# Patient Record
Sex: FEMALE | Race: WHITE | NOT HISPANIC OR LATINO
[De-identification: names, ages, dates, MRNs, and addresses within clinical notes are randomized per-mention and may not be internally consistent; named-entity substitution may affect disease eponyms.]

---

## 2018-12-03 PROBLEM — Z00.00 ENCOUNTER FOR PREVENTIVE HEALTH EXAMINATION: Status: ACTIVE | Noted: 2018-12-03

## 2018-12-27 ENCOUNTER — APPOINTMENT (OUTPATIENT)
Age: 33
End: 2018-12-27
Payer: MEDICAID

## 2018-12-27 ENCOUNTER — ASOB RESULT (OUTPATIENT)
Age: 33
End: 2018-12-27

## 2018-12-27 PROCEDURE — 36416 COLLJ CAPILLARY BLOOD SPEC: CPT

## 2018-12-27 PROCEDURE — 76813 OB US NUCHAL MEAS 1 GEST: CPT

## 2018-12-27 PROCEDURE — 99242 OFF/OP CONSLTJ NEW/EST SF 20: CPT | Mod: 25

## 2018-12-27 PROCEDURE — 76801 OB US < 14 WKS SINGLE FETUS: CPT

## 2019-01-03 ENCOUNTER — ASOB RESULT (OUTPATIENT)
Age: 34
End: 2019-01-03

## 2019-01-03 ENCOUNTER — APPOINTMENT (OUTPATIENT)
Dept: ANTEPARTUM | Facility: CLINIC | Age: 34
End: 2019-01-03
Payer: MEDICAID

## 2019-01-03 PROCEDURE — 36415 COLL VENOUS BLD VENIPUNCTURE: CPT

## 2019-01-03 PROCEDURE — 99213 OFFICE O/P EST LOW 20 MIN: CPT

## 2019-01-31 ENCOUNTER — APPOINTMENT (OUTPATIENT)
Dept: ANTEPARTUM | Facility: CLINIC | Age: 34
End: 2019-01-31

## 2019-02-21 ENCOUNTER — APPOINTMENT (OUTPATIENT)
Dept: ANTEPARTUM | Facility: CLINIC | Age: 34
End: 2019-02-21
Payer: MEDICAID

## 2019-02-21 ENCOUNTER — ASOB RESULT (OUTPATIENT)
Age: 34
End: 2019-02-21

## 2019-02-21 PROCEDURE — 76817 TRANSVAGINAL US OBSTETRIC: CPT

## 2019-02-21 PROCEDURE — 76811 OB US DETAILED SNGL FETUS: CPT

## 2019-07-18 ENCOUNTER — INPATIENT (INPATIENT)
Facility: HOSPITAL | Age: 34
LOS: 2 days | Discharge: HOME | End: 2019-07-21
Attending: SPECIALIST | Admitting: SPECIALIST

## 2019-07-18 DIAGNOSIS — Z3A.00 WEEKS OF GESTATION OF PREGNANCY NOT SPECIFIED: ICD-10-CM

## 2019-07-18 DIAGNOSIS — O26.899 OTHER SPECIFIED PREGNANCY RELATED CONDITIONS, UNSPECIFIED TRIMESTER: ICD-10-CM

## 2019-07-19 ENCOUNTER — TRANSCRIPTION ENCOUNTER (OUTPATIENT)
Age: 34
End: 2019-07-19

## 2019-07-19 VITALS — HEART RATE: 99 BPM | SYSTOLIC BLOOD PRESSURE: 111 MMHG | DIASTOLIC BLOOD PRESSURE: 70 MMHG

## 2019-07-19 LAB
ANISOCYTOSIS BLD QL: SLIGHT — SIGNIFICANT CHANGE UP
BASOPHILS # BLD AUTO: 0.09 K/UL — SIGNIFICANT CHANGE UP (ref 0–0.2)
BASOPHILS NFR BLD AUTO: 0.5 % — SIGNIFICANT CHANGE UP (ref 0–2)
BASOPHILS NFR SPEC: 0 % — SIGNIFICANT CHANGE UP (ref 0–2)
BLASTS # FLD: 0 % — SIGNIFICANT CHANGE UP (ref 0–0)
BLD GP AB SCN SERPL QL: NEGATIVE — SIGNIFICANT CHANGE UP
EOSINOPHIL # BLD AUTO: 0.1 K/UL — SIGNIFICANT CHANGE UP (ref 0–0.5)
EOSINOPHIL NFR BLD AUTO: 0.5 % — SIGNIFICANT CHANGE UP (ref 0–6)
EOSINOPHIL NFR FLD: 0 % — SIGNIFICANT CHANGE UP (ref 0–6)
GIANT PLATELETS BLD QL SMEAR: PRESENT — SIGNIFICANT CHANGE UP
HCT VFR BLD CALC: 39.9 % — SIGNIFICANT CHANGE UP (ref 34.5–45)
HGB BLD-MCNC: 13.2 G/DL — SIGNIFICANT CHANGE UP (ref 11.5–15.5)
IMM GRANULOCYTES NFR BLD AUTO: 0.9 % — SIGNIFICANT CHANGE UP (ref 0–1.5)
LYMPHOCYTES # BLD AUTO: 14.1 % — SIGNIFICANT CHANGE UP (ref 13–44)
LYMPHOCYTES # BLD AUTO: 2.57 K/UL — SIGNIFICANT CHANGE UP (ref 1–3.3)
LYMPHOCYTES NFR SPEC AUTO: 13.1 % — SIGNIFICANT CHANGE UP (ref 13–44)
MCHC RBC-ENTMCNC: 31.3 PG — SIGNIFICANT CHANGE UP (ref 27–34)
MCHC RBC-ENTMCNC: 33.1 % — SIGNIFICANT CHANGE UP (ref 32–36)
MCV RBC AUTO: 94.5 FL — SIGNIFICANT CHANGE UP (ref 80–100)
METAMYELOCYTES # FLD: 0 % — SIGNIFICANT CHANGE UP (ref 0–1)
MONOCYTES # BLD AUTO: 1.67 K/UL — HIGH (ref 0–0.9)
MONOCYTES NFR BLD AUTO: 9.2 % — SIGNIFICANT CHANGE UP (ref 2–14)
MONOCYTES NFR BLD: 7.8 % — SIGNIFICANT CHANGE UP (ref 2–9)
MYELOCYTES NFR BLD: 0 % — SIGNIFICANT CHANGE UP (ref 0–0)
NEUTROPHIL AB SER-ACNC: 73 % — SIGNIFICANT CHANGE UP (ref 43–77)
NEUTROPHILS # BLD AUTO: 13.61 K/UL — HIGH (ref 1.8–7.4)
NEUTROPHILS NFR BLD AUTO: 74.8 % — SIGNIFICANT CHANGE UP (ref 43–77)
NEUTS BAND # BLD: 2.6 % — SIGNIFICANT CHANGE UP (ref 0–6)
NRBC # FLD: 0 K/UL — SIGNIFICANT CHANGE UP (ref 0–0)
OTHER - HEMATOLOGY %: 0 — SIGNIFICANT CHANGE UP
PLATELET # BLD AUTO: 278 K/UL — SIGNIFICANT CHANGE UP (ref 150–400)
PLATELET COUNT - ESTIMATE: NORMAL — SIGNIFICANT CHANGE UP
PMV BLD: 10.1 FL — SIGNIFICANT CHANGE UP (ref 7–13)
PROMYELOCYTES # FLD: 0 % — SIGNIFICANT CHANGE UP (ref 0–0)
RBC # BLD: 4.22 M/UL — SIGNIFICANT CHANGE UP (ref 3.8–5.2)
RBC # FLD: 13 % — SIGNIFICANT CHANGE UP (ref 10.3–14.5)
RH IG SCN BLD-IMP: POSITIVE — SIGNIFICANT CHANGE UP
RH IG SCN BLD-IMP: POSITIVE — SIGNIFICANT CHANGE UP
VARIANT LYMPHS # BLD: 3.5 % — SIGNIFICANT CHANGE UP
WBC # BLD: 18.2 K/UL — HIGH (ref 3.8–10.5)
WBC # FLD AUTO: 18.2 K/UL — HIGH (ref 3.8–10.5)

## 2019-07-19 RX ORDER — LANOLIN
1 OINTMENT (GRAM) TOPICAL EVERY 6 HOURS
Refills: 0 | Status: DISCONTINUED | OUTPATIENT
Start: 2019-07-19 | End: 2019-07-21

## 2019-07-19 RX ORDER — PRAMOXINE HYDROCHLORIDE 150 MG/15G
1 AEROSOL, FOAM RECTAL EVERY 4 HOURS
Refills: 0 | Status: DISCONTINUED | OUTPATIENT
Start: 2019-07-19 | End: 2019-07-21

## 2019-07-19 RX ORDER — TETANUS TOXOID, REDUCED DIPHTHERIA TOXOID AND ACELLULAR PERTUSSIS VACCINE, ADSORBED 5; 2.5; 8; 8; 2.5 [IU]/.5ML; [IU]/.5ML; UG/.5ML; UG/.5ML; UG/.5ML
0.5 SUSPENSION INTRAMUSCULAR ONCE
Refills: 0 | Status: DISCONTINUED | OUTPATIENT
Start: 2019-07-19 | End: 2019-07-21

## 2019-07-19 RX ORDER — AMPICILLIN TRIHYDRATE 250 MG
1 CAPSULE ORAL EVERY 4 HOURS
Refills: 0 | Status: DISCONTINUED | OUTPATIENT
Start: 2019-07-19 | End: 2019-07-19

## 2019-07-19 RX ORDER — ACETAMINOPHEN 500 MG
975 TABLET ORAL
Refills: 0 | Status: DISCONTINUED | OUTPATIENT
Start: 2019-07-19 | End: 2019-07-21

## 2019-07-19 RX ORDER — DIBUCAINE 1 %
1 OINTMENT (GRAM) RECTAL EVERY 6 HOURS
Refills: 0 | Status: DISCONTINUED | OUTPATIENT
Start: 2019-07-19 | End: 2019-07-21

## 2019-07-19 RX ORDER — SODIUM CHLORIDE 9 MG/ML
3 INJECTION INTRAMUSCULAR; INTRAVENOUS; SUBCUTANEOUS EVERY 8 HOURS
Refills: 0 | Status: DISCONTINUED | OUTPATIENT
Start: 2019-07-19 | End: 2019-07-21

## 2019-07-19 RX ORDER — BENZOCAINE 10 %
1 GEL (GRAM) MUCOUS MEMBRANE EVERY 6 HOURS
Refills: 0 | Status: DISCONTINUED | OUTPATIENT
Start: 2019-07-19 | End: 2019-07-21

## 2019-07-19 RX ORDER — DOCUSATE SODIUM 100 MG
100 CAPSULE ORAL
Refills: 0 | Status: DISCONTINUED | OUTPATIENT
Start: 2019-07-19 | End: 2019-07-21

## 2019-07-19 RX ORDER — OXYTOCIN 10 UNIT/ML
VIAL (ML) INJECTION
Qty: 20 | Refills: 0 | Status: DISCONTINUED | OUTPATIENT
Start: 2019-07-19 | End: 2019-07-19

## 2019-07-19 RX ORDER — GLYCERIN ADULT
1 SUPPOSITORY, RECTAL RECTAL AT BEDTIME
Refills: 0 | Status: DISCONTINUED | OUTPATIENT
Start: 2019-07-19 | End: 2019-07-21

## 2019-07-19 RX ORDER — AMPICILLIN TRIHYDRATE 250 MG
2 CAPSULE ORAL ONCE
Refills: 0 | Status: COMPLETED | OUTPATIENT
Start: 2019-07-19 | End: 2019-07-19

## 2019-07-19 RX ORDER — OXYCODONE HYDROCHLORIDE 5 MG/1
5 TABLET ORAL ONCE
Refills: 0 | Status: DISCONTINUED | OUTPATIENT
Start: 2019-07-19 | End: 2019-07-21

## 2019-07-19 RX ORDER — OXYCODONE HYDROCHLORIDE 5 MG/1
5 TABLET ORAL
Refills: 0 | Status: DISCONTINUED | OUTPATIENT
Start: 2019-07-19 | End: 2019-07-21

## 2019-07-19 RX ORDER — OXYTOCIN 10 UNIT/ML
333.33 VIAL (ML) INJECTION
Qty: 20 | Refills: 0 | Status: DISCONTINUED | OUTPATIENT
Start: 2019-07-19 | End: 2019-07-19

## 2019-07-19 RX ORDER — HYDROCORTISONE 1 %
1 OINTMENT (GRAM) TOPICAL EVERY 6 HOURS
Refills: 0 | Status: DISCONTINUED | OUTPATIENT
Start: 2019-07-19 | End: 2019-07-21

## 2019-07-19 RX ORDER — AER TRAVELER 0.5 G/1
1 SOLUTION RECTAL; TOPICAL EVERY 4 HOURS
Refills: 0 | Status: DISCONTINUED | OUTPATIENT
Start: 2019-07-19 | End: 2019-07-21

## 2019-07-19 RX ORDER — SODIUM CHLORIDE 9 MG/ML
1000 INJECTION, SOLUTION INTRAVENOUS
Refills: 0 | Status: DISCONTINUED | OUTPATIENT
Start: 2019-07-19 | End: 2019-07-19

## 2019-07-19 RX ORDER — SIMETHICONE 80 MG/1
80 TABLET, CHEWABLE ORAL EVERY 4 HOURS
Refills: 0 | Status: DISCONTINUED | OUTPATIENT
Start: 2019-07-19 | End: 2019-07-21

## 2019-07-19 RX ORDER — KETOROLAC TROMETHAMINE 30 MG/ML
30 SYRINGE (ML) INJECTION ONCE
Refills: 0 | Status: DISCONTINUED | OUTPATIENT
Start: 2019-07-19 | End: 2019-07-19

## 2019-07-19 RX ORDER — IBUPROFEN 200 MG
600 TABLET ORAL EVERY 6 HOURS
Refills: 0 | Status: COMPLETED | OUTPATIENT
Start: 2019-07-19 | End: 2020-06-16

## 2019-07-19 RX ORDER — OXYTOCIN 10 UNIT/ML
2 VIAL (ML) INJECTION
Qty: 30 | Refills: 0 | Status: DISCONTINUED | OUTPATIENT
Start: 2019-07-19 | End: 2019-07-19

## 2019-07-19 RX ORDER — IBUPROFEN 200 MG
600 TABLET ORAL EVERY 6 HOURS
Refills: 0 | Status: DISCONTINUED | OUTPATIENT
Start: 2019-07-19 | End: 2019-07-21

## 2019-07-19 RX ORDER — DIPHENHYDRAMINE HCL 50 MG
25 CAPSULE ORAL EVERY 6 HOURS
Refills: 0 | Status: DISCONTINUED | OUTPATIENT
Start: 2019-07-19 | End: 2019-07-21

## 2019-07-19 RX ORDER — MAGNESIUM HYDROXIDE 400 MG/1
30 TABLET, CHEWABLE ORAL
Refills: 0 | Status: DISCONTINUED | OUTPATIENT
Start: 2019-07-19 | End: 2019-07-21

## 2019-07-19 RX ADMIN — SODIUM CHLORIDE 3 MILLILITER(S): 9 INJECTION INTRAMUSCULAR; INTRAVENOUS; SUBCUTANEOUS at 23:17

## 2019-07-19 RX ADMIN — Medication 1 TABLET(S): at 17:54

## 2019-07-19 RX ADMIN — Medication 600 MILLIGRAM(S): at 09:35

## 2019-07-19 RX ADMIN — SODIUM CHLORIDE 3 MILLILITER(S): 9 INJECTION INTRAMUSCULAR; INTRAVENOUS; SUBCUTANEOUS at 17:44

## 2019-07-19 RX ADMIN — Medication 216 GRAM(S): at 00:42

## 2019-07-19 RX ADMIN — Medication 2 MILLIUNIT(S)/MIN: at 05:11

## 2019-07-19 RX ADMIN — Medication 600 MILLIGRAM(S): at 09:05

## 2019-07-19 RX ADMIN — Medication 1000 MILLIUNIT(S)/MIN: at 07:00

## 2019-07-19 RX ADMIN — Medication 600 MILLIGRAM(S): at 18:14

## 2019-07-19 RX ADMIN — Medication 108 GRAM(S): at 04:51

## 2019-07-19 RX ADMIN — Medication 600 MILLIGRAM(S): at 17:44

## 2019-07-19 RX ADMIN — SODIUM CHLORIDE 125 MILLILITER(S): 9 INJECTION, SOLUTION INTRAVENOUS at 03:11

## 2019-07-19 NOTE — OB PROVIDER TRIAGE NOTE - NSHPPHYSICALEXAM_GEN_ALL_CORE
Vital Signs Last 24 Hrs  T(C): 36.6 (19 Jul 2019 00:43), Max: 36.7 (19 Jul 2019 00:06)  T(F): 97.9 (19 Jul 2019 00:43), Max: 98.1 (19 Jul 2019 00:06)  HR: 96 (19 Jul 2019 00:46) (96 - 99)  BP: 118/62 (19 Jul 2019 00:46) (111/70 - 118/62)  BP(mean): --  RR: 16 (19 Jul 2019 00:43) (16 - 18)  SpO2: --    abodmen soft nontender gravid  fhr 124 moderate variability  irregular uterine contractions on tocometer    VE: 3/60/-3 intact  Cephalic    EFW: 3040g

## 2019-07-19 NOTE — OB PROVIDER TRIAGE NOTE - HISTORY OF PRESENT ILLNESS
34y.o.  at 40.3weeks presenting with complaints of contractions q8-10 minutes.  Patient is presently reporting 6/10 pain.  Patient reports good fetal movement, denies lof, denies vaginal bleeding.    a/p course complications patient denies  GBS (+) in urine

## 2019-07-19 NOTE — OB PROVIDER H&P - ASSESSMENT
pmh: denies  psh: denies  obhx: SAB complete   -FT-  7lbs 4oz   FT  6lbs 8oz  -PTL-  4lbs 14oz  gynhx: denies    NKDA    Medications: PNV    Assessment  Vital Signs Last 24 Hrs  T(C): 36.6 (2019 00:43), Max: 36.7 (2019 00:06)  T(F): 97.9 (2019 00:43), Max: 98.1 (2019 00:06)  HR: 96 (2019 00:46) (96 - 99)  BP: 118/62 (2019 00:46) (111/70 - 118/62)  BP(mean): --  RR: 16 (2019 00:43) (16 - 18)  SpO2: --    abodmen soft nontender gravid  fhr 124 moderate variability  irregular uterine contractions on tocometer    VE: 3/60/-3 intact  Cephalic    EFW: 3040g    Admit to Labor and Delivery for Labor  Routine Admission Labs  GBS Prophylaxis  For AROM and Epidural    D/w Dr. Colón

## 2019-07-19 NOTE — OB PROVIDER DELIVERY SUMMARY - NSPROVIDERDELIVERYNOTE_OBGYN_ALL_OB_FT
Pt found to be fully dilated with urge to push, pushed approximately 20 minutes with delivery of viable male infant. Head, shoulders and body delivered easily in ADRI position. Placenta delivered intact. Vaginal exam revealed intact cervix, vaginal walls, sulci. No lacerations were identified.  Fundal massage performed, fundus found to be firm. Excellent hemostasis.

## 2019-07-19 NOTE — DISCHARGE NOTE OB - PATIENT PORTAL LINK FT
You can access the [facility], offered by [facility], by registering with the following website: [email or web address]

## 2019-07-19 NOTE — CHART NOTE - NSCHARTNOTEFT_GEN_A_CORE
R3 Labor Note    Pt evaluated for SROM@2:45. Clear fluid    SVE: 4/80/-3  EFM: 150 bpm/mod araceli/+accel/-decel  [place]: irreg 2-4 min    Plan  Cat 1 tracing  Augment with pitocin  amp for GBS ppx  Epidural in place, working    D/w Dr. Eldon Zamora PGY3

## 2019-07-19 NOTE — DISCHARGE NOTE OB - MEDICATION SUMMARY - MEDICATIONS TO TAKE
I will START or STAY ON the medications listed below when I get home from the hospital:    Prenatal 1  -- Indication: For Labor and delivery, indication for care I will START or STAY ON the medications listed below when I get home from the hospital:    acetaminophen 325 mg oral tablet  -- 3 tab(s) by mouth   -- Indication: For pain, as needed    ibuprofen 600 mg oral tablet  -- 1 tab(s) by mouth every 6 hours  -- Indication: For pain, as needed    Prenatal 1  -- Indication: For supplement

## 2019-07-19 NOTE — DISCHARGE NOTE OB - CARE PROVIDER_API CALL
Tino Colón)  Obstetrics and Gynecology  [address] [place] [address]  Phone: [phone]  Fax: [phone]  Follow Up Time:

## 2019-07-19 NOTE — OB RN PATIENT PROFILE - PROVIDER NOTIFICATION
How Severe Are Your Warts?: mild Is This A New Presentation, Or A Follow-Up?: Warts Treatment Number (Optional): 1 Additional History: Patient occasionally treats with ‘acid’ at home also. Declines

## 2019-07-20 RX ORDER — ACETAMINOPHEN 500 MG
3 TABLET ORAL
Qty: 0 | Refills: 0 | DISCHARGE
Start: 2019-07-20

## 2019-07-20 RX ORDER — IBUPROFEN 200 MG
1 TABLET ORAL
Qty: 0 | Refills: 0 | DISCHARGE
Start: 2019-07-20

## 2019-07-20 RX ADMIN — Medication 600 MILLIGRAM(S): at 00:52

## 2019-07-20 RX ADMIN — Medication 1 TABLET(S): at 11:56

## 2019-07-20 RX ADMIN — Medication 600 MILLIGRAM(S): at 00:22

## 2019-07-20 RX ADMIN — SODIUM CHLORIDE 3 MILLILITER(S): 9 INJECTION INTRAMUSCULAR; INTRAVENOUS; SUBCUTANEOUS at 06:09

## 2019-07-20 NOTE — PROGRESS NOTE ADULT - SUBJECTIVE AND OBJECTIVE BOX
S: Patient doing well. Minimal lochia. Pain controlled. breastfeeding    O: Vital Signs Last 24 Hrs  T(C): 36.4 (2019 05:48), Max: 36.7 (2019 17:56)  T(F): 97.6 (2019 05:48), Max: 98 (2019 17:56)  HR: 67 (2019 05:48) (67 - 84)  BP: 98/61 (2019 05:48) (98/61 - 108/68)  BP(mean): --  RR: 18 (2019 05:48) (18 - 18)  SpO2: 100% (2019 05:48) (100% - 100%)    Gen: NAD  Abd: soft, NT, ND, fundus firm below umbilicus  Lochia: moderate  Ext: no tenderness    Labs:                        13.2   18.20 )-----------( 278      ( 2019 01:00 )             39.9       ABO Interpretation: A ( @ 00:56)    Rh Interpretation: Positive ( @ 00:56)    Antibody Screen Negative      A: 34y PPD# s/p  doing well.     Plan: Continue routine postpartum care.

## 2019-07-21 VITALS
SYSTOLIC BLOOD PRESSURE: 105 MMHG | DIASTOLIC BLOOD PRESSURE: 61 MMHG | OXYGEN SATURATION: 100 % | TEMPERATURE: 98 F | HEART RATE: 78 BPM | RESPIRATION RATE: 18 BRPM

## 2019-07-23 LAB — T PALLIDUM AB TITR SER: NEGATIVE — SIGNIFICANT CHANGE UP

## 2020-01-08 ENCOUNTER — RESULT REVIEW (OUTPATIENT)
Age: 35
End: 2020-01-08

## 2022-06-15 ENCOUNTER — RESULT REVIEW (OUTPATIENT)
Age: 37
End: 2022-06-15

## 2022-09-15 NOTE — OB RN PATIENT PROFILE - NS PRO TDAP RECEIVED Y/N
Addended by: AGUILA QUINTANILLA on: 9/15/2022 11:53 AM     Modules accepted: Level of Service    
unable to assess

## 2023-04-19 NOTE — OB PROVIDER H&P - NS_PHYSICALALLNEG_OBGYN_ALL_OB
Eszopiclone 3 MG tablet Take 1 tablet by mouth at bedtime as needed for Sleep  Last refill: 3/22/23 #30 refill 0  Last office visit: 1/24/23 with Zenia     Scheduled office visit: none    Start date 4/21/23    No medication protocol.  PDMP reviewed  Ok, to refill?  
All other review of systems negative, except as noted in HPI

## 2024-01-26 NOTE — OB RN TRIAGE NOTE - TERM DELIVERIES, OB PROFILE
"Patient removed mattress from room and placed in common area.  Patient restless, pacing, ripping toilet paper and throwing like confetti, making circles on floor with toilet paper and blanket.  Patient medicated with PO orders, mattress returned to room, reminded patient she is not to remove mattress from her room.  Patient is cooperative, verbalizes understanding.  Patient states, \"I'm so close, I believe in God even though no one else does.\"       Faye Thornton RN  01/26/24 1200    " 2

## 2024-12-09 NOTE — OB RN PATIENT PROFILE - PHONE #
Add Additional Location: No
Post-Care Instructions: I reviewed with the patient in detail post-care instructions. Patient should stay away from the sun and wear sun protection until treated areas are fully healed.
Cooling: cryo 5
Detail Level: Zone
Pre-Procedure Care: Prior to the procedure the patient and all present had protective eyewear in place and a warning sign was placed on the door.
End-Point And Post-Procedure Care: The procedure continued until mild purpura was noted.  Immediately following the procedure, Vaseline and ice applied. Post care reviewed with patient.
Eye Protection: Laser Aid
Consent: Prior to the procedure consent obtained, risks reviewed including but not limited to crusting, scabbing, blistering, scarring, darker or lighter pigmentary change, incidental hair removal, bruising, and/or incomplete removal.
[phone]

## 2025-02-06 NOTE — OB PROVIDER H&P - SURGICAL SITE INCISION
41 yo female metastatic breast CA recently admitted to hospital fluA positive/anemia, reports of dark stools, found to be anemic on outpatient labs with associated sob improved with NC 02, pt hypoxic on arrival after transportation without home 02, now saturating well on PRN NC 02 @ 4L  per min. pt and family only requesting blood transfusion at this point in time, no further investigation into hypoxia or anemia.   sister broderick whom is HCP confirming FULL CODE, and ONLY for patient to receive blood while in the ED
no

## 2025-07-22 ENCOUNTER — APPOINTMENT (OUTPATIENT)
Facility: CLINIC | Age: 40
End: 2025-07-22
Payer: COMMERCIAL

## 2025-07-22 ENCOUNTER — ASOB RESULT (OUTPATIENT)
Age: 40
End: 2025-07-22

## 2025-07-22 VITALS — DIASTOLIC BLOOD PRESSURE: 77 MMHG | SYSTOLIC BLOOD PRESSURE: 123 MMHG

## 2025-07-22 DIAGNOSIS — Z01.419 ENCOUNTER FOR GYNECOLOGICAL EXAMINATION (GENERAL) (ROUTINE) W/OUT ABNORMAL FINDINGS: ICD-10-CM

## 2025-07-22 DIAGNOSIS — N92.6 IRREGULAR MENSTRUATION, UNSPECIFIED: ICD-10-CM

## 2025-07-22 LAB — HCG UR QL: NEGATIVE

## 2025-07-22 PROCEDURE — 76830 TRANSVAGINAL US NON-OB: CPT

## 2025-07-22 PROCEDURE — 99459 PELVIC EXAMINATION: CPT

## 2025-07-22 PROCEDURE — 76376 3D RENDER W/INTRP POSTPROCES: CPT

## 2025-07-22 PROCEDURE — G0444 DEPRESSION SCREEN ANNUAL: CPT | Mod: 59

## 2025-07-22 PROCEDURE — 99395 PREV VISIT EST AGE 18-39: CPT

## 2025-07-22 PROCEDURE — 76856 US EXAM PELVIC COMPLETE: CPT

## 2025-07-22 PROCEDURE — 81025 URINE PREGNANCY TEST: CPT

## 2025-07-23 PROBLEM — Z01.419 WOMEN'S ANNUAL ROUTINE GYNECOLOGICAL EXAMINATION: Status: ACTIVE | Noted: 2025-07-23

## 2025-07-23 PROBLEM — N92.6 MENSES, IRREGULAR: Status: ACTIVE | Noted: 2025-07-23

## 2025-07-25 LAB — HPV HIGH+LOW RISK DNA PNL CVX: NOT DETECTED

## 2025-07-28 LAB — CYTOLOGY CVX/VAG DOC THIN PREP: NORMAL
